# Patient Record
Sex: MALE | Race: WHITE
[De-identification: names, ages, dates, MRNs, and addresses within clinical notes are randomized per-mention and may not be internally consistent; named-entity substitution may affect disease eponyms.]

---

## 2020-11-06 ENCOUNTER — HOSPITAL ENCOUNTER (EMERGENCY)
Dept: HOSPITAL 77 - KA.ED | Age: 50
Discharge: HOME | End: 2020-11-06
Payer: COMMERCIAL

## 2020-11-06 DIAGNOSIS — S61.211A: Primary | ICD-10-CM
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
## 2020-11-06 NOTE — EDM.PDOC
ED HPI GENERAL MEDICAL PROBLEM





- General


Chief Complaint: Laceration


Stated Complaint: CUT L INDEX FINGER


Time Seen by Provider: 11/06/20 17:14


Source of Information: Reports: Patient


History Limitations: Reports: No Limitations





- History of Present Illness


INITIAL COMMENTS - FREE TEXT/NARRATIVE: 





Patient presents with small laceration on left index finger.  He says he 

accidentally cut it with his jackknife about 3 hours ago.  No numbness or 

weakness in finger.  No other injuries.  Last tetanus was 2018 he says.  It bled

for several minutes but has stopped now.





- Related Data


                                    Allergies











Allergy/AdvReac Type Severity Reaction Status Date / Time


 


No Known Allergies Allergy   Verified 11/06/20 15:48














ED ROS GENERAL





- Review of Systems


Review Of Systems: Comprehensive ROS is negative, except as noted in HPI.





ED EXAM, SKIN/RASH


Exam: See Below


Exam Limited By: No Limitations


General Appearance: Alert, WD/WN, No Apparent Distress


Eye Exam: Bilateral Eye: EOMI, Normal Inspection, PERRL


Ears: Normal External Exam, Hearing Grossly Normal


Nose: Normal Inspection, No Blood


Throat/Mouth: Normal Inspection, Normal Lips, Normal Voice, No Airway Compromise


Head: Atraumatic, Normocephalic


Neck: Normal Inspection, Full Range of Motion


Respiratory/Chest: No Respiratory Distress, Lungs Clear, Normal Breath Sounds


Cardiovascular: Regular Rate, Rhythm, No Murmur


Back Exam: Full Range of Motion


Extremities: Normal Range of Motion, Non-Tender, Normal Capillary Refill, Other 

(1 cm laceration of proximal left index finger without evidence of nerve or 

tendon involvement.  Distal CMS intact.)


Neurological: Alert, Oriented, CN II-XII Intact, Normal Cognition, No 

Motor/Sensory Deficits


Psychiatric: Normal Affect, Normal Mood


Skin: Warm, Dry, Intact, Normal Color, No Rash





ED SKIN PROCEDURES





- Laceration/Wound Repair


  ** Left Dorsal Digit - 2nd (Index)


Appearance: Superficial, Linear, Clean


Distal NVT: Neuro & Vascular Intact, No Tendon Injury


Anesthetic Type: Local


Local Anesthesia - Lidocaine (Xylocaine): 1% Plain


Local Anesthetic Volume: 2cc


Skin Prep: Chlorhexidine (Hibiciens), Sterile Drape


Exploration/Debridement/Repair: Wound Explored, In a Bloodless Field


Closed with: Sutures


Lac/Wound length In cm: 1


Suture Size: 5-0


# of Sutures: 2


Suture Type: Nylon, Interrupted, Simple


Sterile Dressing Applied: Nurse


Tetanus Status Addressed: Yes


Complications: No





Course





- Vital Signs


Last Recorded V/S: 


                                Last Vital Signs











Temp  97.7 F   11/06/20 15:45


 


Pulse  91   11/06/20 15:45


 


Resp  18   11/06/20 15:45


 


BP  130/87   11/06/20 15:45


 


Pulse Ox  95   11/06/20 15:45














- Orders/Labs/Meds


Meds: 


Medications














Discontinued Medications














Generic Name Dose Route Start Last Admin





  Trade Name Judit  PRN Reason Stop Dose Admin


 


Lidocaine HCl  Confirm  11/06/20 18:01  11/06/20 20:36





  Xylocaine 1%  Administered  11/06/20 18:02  Not Given





  Dose  





  20 ml  





  .ROUTE  





  .STK-MED ONE  


 


Lidocaine HCl  20 ml  11/06/20 18:00  11/06/20 18:00





  Xylocaine 1%  INJECT  11/06/20 18:01  20 ml





  ONETIME ONE   Administration














- Re-Assessments/Exams


Free Text/Narrative Re-Assessment/Exam: 





11/07/20 00:17


Discussed findings and treatment plan with patient.  Sutures placed as above 

using sterile technique throughout.  Discharged to home in stable condition.





Departure





- Departure


Time of Disposition: 18:39


Disposition: Home, Self-Care 01


Condition: Good


Clinical Impression: 


Finger laceration


Qualifiers:


 Encounter type: initial encounter Finger: index finger Damage to nail status: 

without damage Foreign body presence: without foreign body Laterality: right 

Qualified Code(s): S61.210A - Laceration without foreign body of right index 

finger without damage to nail, initial encounter








- Discharge Information


Instructions:  Laceration Care, Adult, Easy-to-Read


Referrals: 


Monserrat Husain MD [Primary Care Provider] - 


Forms:  ED Department Discharge


Additional Instructions: 


Keep wound clean.  May wash under fresh running water or shower.





Follow up with your PCP for suture removal in ten days.





Recheck sooner if any problems.





Sepsis Event Note (ED)





- Evaluation


Sepsis Screening Result: No Definite Risk





- Focused Exam


Vital Signs: 


                                   Vital Signs











  Temp Pulse Resp BP Pulse Ox


 


 11/06/20 15:45  97.7 F  91  18  130/87  95